# Patient Record
Sex: MALE | Race: BLACK OR AFRICAN AMERICAN | NOT HISPANIC OR LATINO | URBAN - NONMETROPOLITAN AREA
[De-identification: names, ages, dates, MRNs, and addresses within clinical notes are randomized per-mention and may not be internally consistent; named-entity substitution may affect disease eponyms.]

---

## 2023-03-15 ENCOUNTER — APPOINTMENT (OUTPATIENT)
Dept: URBAN - NONMETROPOLITAN AREA CLINIC 45 | Age: 23
Setting detail: DERMATOLOGY
End: 2023-03-15

## 2023-03-15 DIAGNOSIS — L65.8 OTHER SPECIFIED NONSCARRING HAIR LOSS: ICD-10-CM

## 2023-03-15 DIAGNOSIS — L30.8 OTHER SPECIFIED DERMATITIS: ICD-10-CM

## 2023-03-15 DIAGNOSIS — L21.8 OTHER SEBORRHEIC DERMATITIS: ICD-10-CM

## 2023-03-15 PROCEDURE — 99203 OFFICE O/P NEW LOW 30 MIN: CPT

## 2023-03-15 PROCEDURE — OTHER TREATMENT REGIMEN: OTHER

## 2023-03-15 PROCEDURE — OTHER MIPS QUALITY: OTHER

## 2023-03-15 ASSESSMENT — LOCATION DETAILED DESCRIPTION DERM
LOCATION DETAILED: RIGHT ULNAR DORSAL HAND
LOCATION DETAILED: LEFT RADIAL DORSAL HAND

## 2023-03-15 ASSESSMENT — LOCATION SIMPLE DESCRIPTION DERM
LOCATION SIMPLE: RIGHT HAND
LOCATION SIMPLE: LEFT HAND

## 2023-03-15 ASSESSMENT — LOCATION ZONE DERM: LOCATION ZONE: HAND

## 2023-03-15 NOTE — PROCEDURE: TREATMENT REGIMEN
Detail Level: Zone
Plan: Avoid any tension - recommended a relaxing hairstyle\\nRecommended OTC rogaine (Minoxidil) 5% mousse 1-2 times a day\\nConsider future Finasteride if condition worsens since there is a family history of androgenetic alopecia
Plan: Recommended S[place] blue moisturizing shampoo with aloe alternating with head and shoulders 2 in 1 (leave on 1-2 minutes and rinse - May use regular shampoo/conditioner after)
Plan: Increase emollients (Cerave cream)\\nMild cleansers (Dove sensitive skin soap).

## 2023-03-15 NOTE — HPI: OTHER
Condition:: Concerned about thinning hair.
Please Describe Your Condition:: is a new patient who is being seen for a chief complaint of Concerned about thinning hair. Located on the scalp. Pt states he recently had a tight braid hairstyle.